# Patient Record
Sex: MALE | Race: WHITE | Employment: OTHER | URBAN - METROPOLITAN AREA
[De-identification: names, ages, dates, MRNs, and addresses within clinical notes are randomized per-mention and may not be internally consistent; named-entity substitution may affect disease eponyms.]

---

## 2017-01-18 ENCOUNTER — HOSPITAL ENCOUNTER (OUTPATIENT)
Dept: ULTRASOUND IMAGING | Age: 67
Discharge: HOME | End: 2017-01-18
Attending: SPECIALIST
Payer: MEDICARE

## 2017-01-18 DIAGNOSIS — E04.1 NONTOXIC UNINODULAR GOITER: ICD-10-CM

## 2017-01-18 PROCEDURE — 10022 US GUIDE FINE NDL ASP W IMAGE: CPT

## 2017-01-18 PROCEDURE — 88173 CYTOPATH EVAL FNA REPORT: CPT | Performed by: SPECIALIST

## 2017-01-18 PROCEDURE — 88172 CYTP DX EVAL FNA 1ST EA SITE: CPT | Performed by: SPECIALIST

## 2017-02-28 ENCOUNTER — HOSPITAL ENCOUNTER (OUTPATIENT)
Dept: GENERAL RADIOLOGY | Age: 67
Discharge: HOME | End: 2017-02-28
Payer: MEDICARE

## 2017-02-28 DIAGNOSIS — S80.01XA CONTUSION OF KNEE, RIGHT: ICD-10-CM

## 2017-02-28 PROCEDURE — 73562 X-RAY EXAM OF KNEE 3: CPT

## 2019-01-02 RX ORDER — ASPIRIN 325 MG
325 TABLET ORAL DAILY
COMMUNITY
End: 2022-10-11 | Stop reason: ALTCHOICE

## 2019-01-02 RX ORDER — AMLODIPINE BESYLATE 10 MG/1
10 TABLET ORAL DAILY
COMMUNITY
End: 2022-08-18

## 2019-01-02 RX ORDER — ENALAPRIL MALEATE 20 MG/1
20 TABLET ORAL DAILY
COMMUNITY
End: 2022-10-11 | Stop reason: ALTCHOICE

## 2019-01-02 RX ORDER — SIMVASTATIN 20 MG/1
20 TABLET, FILM COATED ORAL
COMMUNITY
End: 2022-10-11 | Stop reason: SDUPTHER

## 2019-01-02 RX ORDER — CHOLECALCIFEROL (VITAMIN D3) 125 MCG
4000 CAPSULE ORAL
COMMUNITY

## 2019-01-03 ENCOUNTER — ANESTHESIA EVENT (OUTPATIENT)
Dept: ENDOSCOPY | Age: 69
End: 2019-01-03
Payer: MEDICARE

## 2019-01-03 ENCOUNTER — HOSPITAL ENCOUNTER (OUTPATIENT)
Age: 69
Setting detail: OUTPATIENT SURGERY
Discharge: HOME | End: 2019-01-03
Attending: INTERNAL MEDICINE | Admitting: INTERNAL MEDICINE
Payer: MEDICARE

## 2019-01-03 ENCOUNTER — ANESTHESIA (OUTPATIENT)
Dept: ENDOSCOPY | Age: 69
End: 2019-01-03
Payer: MEDICARE

## 2019-01-03 VITALS
WEIGHT: 239.06 LBS | HEIGHT: 71 IN | OXYGEN SATURATION: 96 % | SYSTOLIC BLOOD PRESSURE: 155 MMHG | DIASTOLIC BLOOD PRESSURE: 77 MMHG | HEART RATE: 87 BPM | RESPIRATION RATE: 12 BRPM | TEMPERATURE: 97.5 F | BODY MASS INDEX: 33.47 KG/M2

## 2019-01-03 PROCEDURE — 76060000031 HC ANESTHESIA FIRST 0.5 HR: Performed by: INTERNAL MEDICINE

## 2019-01-03 PROCEDURE — 74011250636 HC RX REV CODE- 250/636: Performed by: INTERNAL MEDICINE

## 2019-01-03 PROCEDURE — 74011250636 HC RX REV CODE- 250/636

## 2019-01-03 PROCEDURE — 76040000019: Performed by: INTERNAL MEDICINE

## 2019-01-03 RX ORDER — NALOXONE HYDROCHLORIDE 0.4 MG/ML
0.4 INJECTION, SOLUTION INTRAMUSCULAR; INTRAVENOUS; SUBCUTANEOUS
Status: DISCONTINUED | OUTPATIENT
Start: 2019-01-03 | End: 2019-01-03 | Stop reason: HOSPADM

## 2019-01-03 RX ORDER — FENTANYL CITRATE 50 UG/ML
25 INJECTION, SOLUTION INTRAMUSCULAR; INTRAVENOUS
Status: DISCONTINUED | OUTPATIENT
Start: 2019-01-03 | End: 2019-01-03 | Stop reason: HOSPADM

## 2019-01-03 RX ORDER — EPINEPHRINE 0.1 MG/ML
1 INJECTION INTRACARDIAC; INTRAVENOUS
Status: DISCONTINUED | OUTPATIENT
Start: 2019-01-03 | End: 2019-01-03 | Stop reason: HOSPADM

## 2019-01-03 RX ORDER — SODIUM CHLORIDE 0.9 % (FLUSH) 0.9 %
5-10 SYRINGE (ML) INJECTION EVERY 8 HOURS
Status: DISCONTINUED | OUTPATIENT
Start: 2019-01-03 | End: 2019-01-03 | Stop reason: HOSPADM

## 2019-01-03 RX ORDER — LIDOCAINE HYDROCHLORIDE 20 MG/ML
INJECTION, SOLUTION EPIDURAL; INFILTRATION; INTRACAUDAL; PERINEURAL AS NEEDED
Status: DISCONTINUED | OUTPATIENT
Start: 2019-01-03 | End: 2019-01-03 | Stop reason: HOSPADM

## 2019-01-03 RX ORDER — DEXTROMETHORPHAN/PSEUDOEPHED 2.5-7.5/.8
1.2 DROPS ORAL
Status: DISCONTINUED | OUTPATIENT
Start: 2019-01-03 | End: 2019-01-03 | Stop reason: HOSPADM

## 2019-01-03 RX ORDER — MIDAZOLAM HYDROCHLORIDE 1 MG/ML
.25-5 INJECTION, SOLUTION INTRAMUSCULAR; INTRAVENOUS
Status: DISCONTINUED | OUTPATIENT
Start: 2019-01-03 | End: 2019-01-03 | Stop reason: HOSPADM

## 2019-01-03 RX ORDER — SODIUM CHLORIDE 0.9 % (FLUSH) 0.9 %
5-10 SYRINGE (ML) INJECTION AS NEEDED
Status: DISCONTINUED | OUTPATIENT
Start: 2019-01-03 | End: 2019-01-03 | Stop reason: HOSPADM

## 2019-01-03 RX ORDER — FLUMAZENIL 0.1 MG/ML
0.2 INJECTION INTRAVENOUS
Status: DISCONTINUED | OUTPATIENT
Start: 2019-01-03 | End: 2019-01-03 | Stop reason: HOSPADM

## 2019-01-03 RX ORDER — SODIUM CHLORIDE 9 MG/ML
75 INJECTION, SOLUTION INTRAVENOUS CONTINUOUS
Status: DISCONTINUED | OUTPATIENT
Start: 2019-01-03 | End: 2019-01-03 | Stop reason: HOSPADM

## 2019-01-03 RX ORDER — FENTANYL CITRATE 50 UG/ML
50 INJECTION, SOLUTION INTRAMUSCULAR; INTRAVENOUS
Status: DISCONTINUED | OUTPATIENT
Start: 2019-01-03 | End: 2019-01-03 | Stop reason: HOSPADM

## 2019-01-03 RX ORDER — PROPOFOL 10 MG/ML
INJECTION, EMULSION INTRAVENOUS AS NEEDED
Status: DISCONTINUED | OUTPATIENT
Start: 2019-01-03 | End: 2019-01-03 | Stop reason: HOSPADM

## 2019-01-03 RX ORDER — ATROPINE SULFATE 0.1 MG/ML
0.5 INJECTION INTRAVENOUS
Status: DISCONTINUED | OUTPATIENT
Start: 2019-01-03 | End: 2019-01-03 | Stop reason: HOSPADM

## 2019-01-03 RX ADMIN — PROPOFOL 50 MG: 10 INJECTION, EMULSION INTRAVENOUS at 08:14

## 2019-01-03 RX ADMIN — PROPOFOL 25 MG: 10 INJECTION, EMULSION INTRAVENOUS at 08:16

## 2019-01-03 RX ADMIN — PROPOFOL 20 MG: 10 INJECTION, EMULSION INTRAVENOUS at 08:12

## 2019-01-03 RX ADMIN — PROPOFOL 25 MG: 10 INJECTION, EMULSION INTRAVENOUS at 08:19

## 2019-01-03 RX ADMIN — PROPOFOL 25 MG: 10 INJECTION, EMULSION INTRAVENOUS at 08:20

## 2019-01-03 RX ADMIN — PROPOFOL 80 MG: 10 INJECTION, EMULSION INTRAVENOUS at 08:11

## 2019-01-03 RX ADMIN — PROPOFOL 25 MG: 10 INJECTION, EMULSION INTRAVENOUS at 08:15

## 2019-01-03 RX ADMIN — PROPOFOL 25 MG: 10 INJECTION, EMULSION INTRAVENOUS at 08:22

## 2019-01-03 RX ADMIN — PROPOFOL 25 MG: 10 INJECTION, EMULSION INTRAVENOUS at 08:13

## 2019-01-03 RX ADMIN — SODIUM CHLORIDE 75 ML/HR: 900 INJECTION, SOLUTION INTRAVENOUS at 07:54

## 2019-01-03 RX ADMIN — LIDOCAINE HYDROCHLORIDE 20 MG: 20 INJECTION, SOLUTION EPIDURAL; INFILTRATION; INTRACAUDAL; PERINEURAL at 08:11

## 2019-01-03 NOTE — H&P
Gastroenterology Outpatient History and Physical    Patient: Juventino Bernardo    Physician: Mariana Jurado MD    Chief Complaint: CRC screening  History of Present Illness: 70yo M with need for CRC screening, last colonoscopy in 2002. Some perianal discomfort related to anal skin tags. History:  Past Medical History:   Diagnosis Date    Hypertension       Past Surgical History:   Procedure Laterality Date    HX APPENDECTOMY        Social History     Socioeconomic History    Marital status:      Spouse name: Not on file    Number of children: Not on file    Years of education: Not on file    Highest education level: Not on file   Tobacco Use    Smoking status: Never Smoker    Smokeless tobacco: Never Used   Substance and Sexual Activity    Alcohol use: Yes     Comment: 2 beers per month    Drug use: No      Family History   Problem Relation Age of Onset    Hypertension Mother     Cancer Mother     Cancer Sister     There is no problem list on file for this patient. Allergies: No Known Allergies  Medications:   Prior to Admission medications    Medication Sig Start Date End Date Taking? Authorizing Provider   amLODIPine (NORVASC) 10 mg tablet Take 10 mg by mouth daily. Yes Provider, Historical   enalapril (VASOTEC) 20 mg tablet Take 20 mg by mouth daily. Yes Provider, Historical   aspirin (ASPIRIN) 325 mg tablet Take 325 mg by mouth daily. Yes Provider, Historical   simvastatin (ZOCOR) 20 mg tablet Take 20 mg by mouth nightly. Yes Provider, Historical   cholecalciferol, vitamin D3, (VITAMIN D3) 2,000 unit tab Take 4,000 Units by mouth. Yes Provider, Historical   TURMERIC PO Take 1,000 mg by mouth. Yes Provider, Historical   multivit-min/folic/vit K/lycop (MEN'S DAILY FORMULA PO) Take  by mouth daily. Yes Provider, Historical     Physical Exam:   Vital Signs: Blood pressure (!) 163/93, pulse 95, temperature 98.1 °F (36.7 °C), resp.  rate 18, height 5' 11\" (1.803 m), weight 108.4 kg (239 lb 1 oz), SpO2 96 %.   General: well developed, well nourished   HEENT: unremarkable   Heart: regular rhythm no mumur    Lungs: clear   Abdominal:  benign   Neurological: unremarkable   Extremities: no edema     Findings/Diagnosis: CRC screening  Plan of Care/Planned Procedure: Colonoscopy with conscious/deep sedation    Signed:  Branden Levin MD 1/3/2019

## 2019-01-03 NOTE — ROUTINE PROCESS
Shannon Burks  1950  [id number]    Situation:  Verbal report received from: Lauren Nieto RN  Procedure: Procedure(s):  COLONOSCOPY    Background:    Preoperative diagnosis: ANAL SKIN TAG, SCREENING  Postoperative diagnosis: internal hemorrhoids    :  Dr. Marilyn Bazan  Assistant(s): Endoscopy Technician-1: Arabella Wright  Endoscopy RN-1: Angelo Linn    Specimens: * No specimens in log *  H. Pylori  no    Assessment:  Intra-procedure medications     Anesthesia gave intra-procedure sedation and medications, see anesthesia flow sheet     Intravenous fluids: NS@ KVO     Vital signs stable     Abdominal assessment: round and soft     Recommendation:  Discharge patient per MD order  Family or Friend     Permission to share finding with family or friend

## 2019-01-03 NOTE — ANESTHESIA PREPROCEDURE EVALUATION
Anesthetic History   No history of anesthetic complications            Review of Systems / Medical History  Patient summary reviewed, nursing notes reviewed and pertinent labs reviewed    Pulmonary  Within defined limits                 Neuro/Psych   Within defined limits           Cardiovascular  Within defined limits  Hypertension              Exercise tolerance: >4 METS     GI/Hepatic/Renal  Within defined limits              Endo/Other  Within defined limits           Other Findings              Physical Exam    Airway  Mallampati: II  TM Distance: 4 - 6 cm  Neck ROM: normal range of motion   Mouth opening: Normal     Cardiovascular  Regular rate and rhythm,  S1 and S2 normal,  no murmur, click, rub, or gallop             Dental  No notable dental hx       Pulmonary  Breath sounds clear to auscultation               Abdominal  GI exam deferred       Other Findings            Anesthetic Plan    ASA: 2  Anesthesia type: general and total IV anesthesia          Induction: Intravenous  Anesthetic plan and risks discussed with: Patient      Propofol MAC

## 2019-01-03 NOTE — ANESTHESIA POSTPROCEDURE EVALUATION
Procedure(s):  COLONOSCOPY. Anesthesia Post Evaluation        Patient location during evaluation: PACU  Note status: Adequate. Level of consciousness: responsive to verbal stimuli and sleepy but conscious  Pain management: satisfactory to patient  Airway patency: patent  Anesthetic complications: no  Cardiovascular status: acceptable  Respiratory status: acceptable  Hydration status: acceptable  Comments: +Post-Anesthesia Evaluation and Assessment    Patient: Radha Jessica MRN: [id number]  SSN: [id number]   YOB: 1950  Age: 70 y.o. Sex: male      Cardiovascular Function/Vital Signs    /89   Pulse 91   Temp 36.4 °C (97.5 °F)   Resp 14   Ht 5' 11\" (1.803 m)   Wt 108.4 kg (239 lb 1 oz)   SpO2 96%   BMI 33.34 kg/m²     Patient is status post Procedure(s):  COLONOSCOPY. Nausea/Vomiting: Controlled. Postoperative hydration reviewed and adequate. Pain:  Pain Scale 1: Visual (01/03/19 0844)  Pain Intensity 1: 0 (01/03/19 0836)   Managed. Neurological Status: At baseline. Mental Status and Level of Consciousness: Arousable. Pulmonary Status:   O2 Device: Room air (01/03/19 0844)   Adequate oxygenation and airway patent. Complications related to anesthesia: None    Post-anesthesia assessment completed. No concerns.     Signed By: Yu Singh MD    1/3/2019  Post anesthesia nausea and vomiting:  controlled      Visit Vitals  /89   Pulse 91   Temp 36.4 °C (97.5 °F)   Resp 14   Ht 5' 11\" (1.803 m)   Wt 108.4 kg (239 lb 1 oz)   SpO2 96%   BMI 33.34 kg/m²

## 2019-01-03 NOTE — DISCHARGE INSTRUCTIONS
Vannessa Melgar  [id number]  1950    COLON DISCHARGE INSTRUCTIONS  Discomfort:  Redness at IV site- apply warm compress to area; if redness or soreness persist- contact your physician  There may be a slight amount of blood passed from the rectum  Gaseous discomfort- walking, belching will help relieve any discomfort  You may not operate a vehicle for 12 hours  You may not engage in an occupation involving machinery or appliances for rest of today  You may not drink alcoholic beverages for at least 12 hours  Avoid making any critical decisions for at least 24 hour  DIET:   High fiber diet. - however -  remember your colon is empty and a heavy meal will produce gas. Avoid these foods:  vegetables, fried / greasy foods, carbonated drinks for today  MEDICATION:         ACTIVITY:  You may not resume your normal daily activities until tomorrow AM; it is recommended that you spend the remainder of the day resting -  avoid any strenuous activity. CALL M.D.   ANY SIGN OF:   Increasing pain, nausea, vomiting  Abdominal distension (swelling)  New increased bleeding (oral or rectal)  Fever (chills)  IMPRESSION:  -Grade 1 Internal and external hemorrhoids with skin tags  -No colon masses or polyps noted    Follow-up Instructions:   Call Dr. Lucía Botello if questions arise regarding your procedure  Telephone # [phone]  Repeat colonoscopy in 10 years    Mell Del Castillo MD

## 2019-01-03 NOTE — PROCEDURES
NAME:  Rain Carroll   :   1950   MRN:   [id number]     Date/Time:  1/3/2019 8:25 AM    Colonoscopy Operative Report    Procedure Type:   Colonoscopy --screening     Indications:     Screening colonoscopy  Pre-operative Diagnosis: see indication above  Post-operative Diagnosis:  See findings below  :  Magy Johnson MD  Referring Provider: Maximilian Recio MD    Exam:  Airway: clear, no airway problems anticipated  Heart: RRR, without gallops or rubs  Lungs: clear bilaterally without wheezes, crackles, or rhonchi  Abdomen: soft, nontender, nondistended, bowel sounds present  Mental Status: awake, alert and oriented to person, place and time    Sedation:  MAC anesthesia Propofol 300mg IV  Procedure Details:  After informed consent was obtained with all risks and benefits of procedure explained and preoperative exam completed, the patient was taken to the endoscopy suite and placed in the left lateral decubitus position. Upon sequential sedation as per above, a digital rectal exam was performed demonstrating internal and external hemorrhoids. The Olympus videocolonoscope  was inserted in the rectum and carefully advanced to the cecum, which was identified by the ileocecal valve and appendiceal orifice. The quality of preparation was adequate. The colonoscope was slowly withdrawn with careful evaluation between folds. Retroflexion in the rectum was completed demonstrating internal and external hemorrhoids. Findings:     -Grade 1 Internal and external hemorrhoids with skin tags  -No colon masses or polyps noted    Specimen Removed:  None. Complications: None. EBL:  None. Impression:    -Grade 1 Internal and external hemorrhoids with skin tags  -No colon masses or polyps noted    Recommendations: --For colon cancer screening in this average-risk patient, colonoscopy may be repeated in 10 years. High fiber diet. Resume normal medication(s).          Discharge Disposition:  Home in the company of a  when able to ambulate.     Elisa Rojas MD

## 2019-05-15 ENCOUNTER — HOSPITAL ENCOUNTER (OUTPATIENT)
Dept: ULTRASOUND IMAGING | Age: 69
Discharge: HOME | End: 2019-05-15
Attending: INTERNAL MEDICINE
Payer: MEDICARE

## 2019-05-15 DIAGNOSIS — R80.9 PROTEINURIA: ICD-10-CM

## 2019-05-15 DIAGNOSIS — N18.2 CHRONIC KIDNEY DISEASE, STAGE II (MILD): ICD-10-CM

## 2019-05-15 PROCEDURE — 76770 US EXAM ABDO BACK WALL COMP: CPT

## 2022-08-18 ENCOUNTER — HOSPITAL ENCOUNTER (EMERGENCY)
Age: 72
Discharge: HOME | End: 2022-08-18
Attending: EMERGENCY MEDICINE
Payer: MEDICARE

## 2022-08-18 VITALS
TEMPERATURE: 98.1 F | SYSTOLIC BLOOD PRESSURE: 183 MMHG | DIASTOLIC BLOOD PRESSURE: 98 MMHG | WEIGHT: 215 LBS | HEIGHT: 71 IN | OXYGEN SATURATION: 97 % | BODY MASS INDEX: 30.1 KG/M2 | HEART RATE: 79 BPM | RESPIRATION RATE: 18 BRPM

## 2022-08-18 DIAGNOSIS — I16.0 ASYMPTOMATIC HYPERTENSIVE URGENCY: Primary | ICD-10-CM

## 2022-08-18 LAB
ANION GAP SERPL CALC-SCNC: 5 MMOL/L (ref 5–15)
ATRIAL RATE: 85 BPM
BUN SERPL-MCNC: 23 MG/DL (ref 6–20)
BUN/CREAT SERPL: 20 (ref 12–20)
CALCIUM SERPL-MCNC: 9.2 MG/DL (ref 8.5–10.1)
CALCULATED P AXIS, ECG09: 40 DEGREES
CALCULATED R AXIS, ECG10: -17 DEGREES
CALCULATED T AXIS, ECG11: 42 DEGREES
CHLORIDE SERPL-SCNC: 104 MMOL/L (ref 97–108)
CO2 SERPL-SCNC: 30 MMOL/L (ref 21–32)
COMMENT, HOLDF: NORMAL
CREAT SERPL-MCNC: 1.13 MG/DL (ref 0.7–1.3)
DIAGNOSIS, 93000: NORMAL
GLUCOSE SERPL-MCNC: 103 MG/DL (ref 65–100)
P-R INTERVAL, ECG05: 176 MS
POTASSIUM SERPL-SCNC: 3.6 MMOL/L (ref 3.5–5.1)
Q-T INTERVAL, ECG07: 380 MS
QRS DURATION, ECG06: 86 MS
QTC CALCULATION (BEZET), ECG08: 452 MS
SAMPLES BEING HELD,HOLD: NORMAL
SODIUM SERPL-SCNC: 139 MMOL/L (ref 136–145)
VENTRICULAR RATE, ECG03: 85 BPM

## 2022-08-18 PROCEDURE — 80048 BASIC METABOLIC PNL TOTAL CA: CPT

## 2022-08-18 PROCEDURE — 93005 ELECTROCARDIOGRAM TRACING: CPT

## 2022-08-18 PROCEDURE — 74011250637 HC RX REV CODE- 250/637

## 2022-08-18 PROCEDURE — 99284 EMERGENCY DEPT VISIT MOD MDM: CPT

## 2022-08-18 PROCEDURE — 36415 COLL VENOUS BLD VENIPUNCTURE: CPT

## 2022-08-18 RX ORDER — CLONIDINE HYDROCHLORIDE 0.1 MG/1
0.1 TABLET ORAL
Status: COMPLETED | OUTPATIENT
Start: 2022-08-18 | End: 2022-08-18

## 2022-08-18 RX ORDER — AMLODIPINE BESYLATE 10 MG/1
10 TABLET ORAL DAILY
Qty: 30 TABLET | Refills: 0 | Status: SHIPPED | OUTPATIENT
Start: 2022-08-18 | End: 2022-09-17

## 2022-08-18 RX ADMIN — CLONIDINE HYDROCHLORIDE 0.1 MG: 0.1 TABLET ORAL at 12:21

## 2022-08-18 NOTE — ED PROVIDER NOTES
EMERGENCY DEPARTMENT HISTORY AND PHYSICAL EXAM          Date: 8/18/2022  Patient Name: Roney Dawkins  Attending of Record: Baljinder Gómez MD     History of Presenting Illness     Chief Complaint   Patient presents with    Hypertension     Pt arrives via EMS from home d/t hypertension. Pt felt BP \"spike\" this am and called EMS. Issue seemed to resolve after he took losartan 50 mg and 81 mg asa. Pt felt BP spike again and called EMS once more to be transported to hospital. Pt note he had some dizziness this am which is now resolved. History Provided By: Patient and Patient's Wife    HPI: Patient is a 70-year-old male with past medical history of hypertension who presents emergency department with hypertension. He reports redness this morning and feeling a slight pressure in his head that is typically what he feels when his blood pressure is elevated. He checked his pressure and it was in the low 569Q systolic. Shortly later the patient took his home antihypertensives and his wife encouraged him to call EMS. Approximate hour after his initial blood pressure was elevated EMS arrived at that time his pressure was reportedly 179 systolic. He declined transport to the ED and stayed home. Approximate hour after that his blood pressure was again noted to be in the 345I systolic at that time patient spoke with his PCP who encouraged him to come straight to the emergency department. Patient reports that he has had no chest pain, no shortness of breath, no abdominal pain, no nausea, no vomiting, no fevers, no chills. He denies headache but states he has had a slight head pressure however this is nearly resolved at this time. Patient is currently taking losartan 50 mg daily. No recent medication changes. Formerly on amlodipine 10 mg. Symptoms of been intermittent, improved with medications. No other complaints at this time. There are no other complaints, changes, or physical findings at this time. PCP: Jazlyn Nathan MD    Current Outpatient Medications   Medication Sig Dispense Refill    amLODIPine (NORVASC) 10 mg tablet Take 1 Tablet by mouth daily for 30 days. 30 Tablet 0    enalapril (VASOTEC) 20 mg tablet Take 20 mg by mouth daily. aspirin (ASPIRIN) 325 mg tablet Take 325 mg by mouth daily. simvastatin (ZOCOR) 20 mg tablet Take 20 mg by mouth nightly. cholecalciferol, vitamin D3, (VITAMIN D3) 2,000 unit tab Take 4,000 Units by mouth. TURMERIC PO Take 1,000 mg by mouth.      multivit-min/folic/vit K/lycop (MEN'S DAILY FORMULA PO) Take  by mouth daily. Past History     Past Medical History:  Past Medical History:   Diagnosis Date    Hypertension        Past Surgical History:  Past Surgical History:   Procedure Laterality Date    COLONOSCOPY N/A 1/3/2019    COLONOSCOPY performed by Deepak Garcia MD at \[facility]\"" ENDOSCOPY    COLONOSCOPY,DIAGNOSTIC  1/3/2019         HX APPENDECTOMY         Family History:  Family History   Problem Relation Age of Onset    Hypertension Mother     Cancer Mother     Cancer Sister        Social History:  Social History     Tobacco Use    Smoking status: Never    Smokeless tobacco: Never   Substance Use Topics    Alcohol use: Yes     Comment: 2 beers per month    Drug use: No       Allergies:  No Known Allergies      Review of Systems   Review of Systems   Constitutional:  Negative for chills and fever. HENT:  Negative for ear pain and sore throat. Eyes:  Negative for redness and visual disturbance. Respiratory:  Negative for cough and shortness of breath. Cardiovascular:  Negative for chest pain. Gastrointestinal:  Negative for abdominal pain, constipation, diarrhea, nausea and vomiting. Genitourinary:  Negative for dysuria and hematuria. Musculoskeletal:  Negative for arthralgias and myalgias. Skin:  Negative for rash. Neurological:  Positive for headaches (\"pressure, not a headache\"). Negative for syncope.    Psychiatric/Behavioral: Negative for agitation and confusion. Physical Exam   Physical Exam  Constitutional:       General: He is awake. He is not in acute distress. HENT:      Head: Normocephalic and atraumatic. Right Ear: External ear normal.      Left Ear: External ear normal.      Nose: Nose normal.      Mouth/Throat:      Mouth: Mucous membranes are moist.      Pharynx: Oropharynx is clear. Eyes:      General: No scleral icterus. Conjunctiva/sclera: Conjunctivae normal.   Neck:      Thyroid: No thyromegaly. Trachea: No tracheal deviation. Cardiovascular:      Rate and Rhythm: Normal rate and regular rhythm. Pulmonary:      Effort: Pulmonary effort is normal. No respiratory distress. Breath sounds: Normal breath sounds. Abdominal:      Palpations: Abdomen is soft. Tenderness: There is no abdominal tenderness. Musculoskeletal:         General: No tenderness or deformity. Cervical back: No tenderness. Skin:     General: Skin is warm and dry. Neurological:      General: No focal deficit present. Mental Status: He is alert.    Psychiatric:         Mood and Affect: Mood normal.         Behavior: Behavior normal.        Diagnostic Study Results     Labs -     Recent Results (from the past 12 hour(s))   EKG, 12 LEAD, INITIAL    Collection Time: 08/18/22  9:53 AM   Result Value Ref Range    Ventricular Rate 85 BPM    Atrial Rate 85 BPM    P-R Interval 176 ms    QRS Duration 86 ms    Q-T Interval 380 ms    QTC Calculation (Bezet) 452 ms    Calculated P Axis 40 degrees    Calculated R Axis -17 degrees    Calculated T Axis 42 degrees    Diagnosis       Normal sinus rhythm  No previous ECGs available  Confirmed by Elizabeth Amaro ([id number]) on 8/18/2022 11:47:19 AM     SAMPLES BEING HELD    Collection Time: 08/18/22 10:14 AM   Result Value Ref Range    SAMPLES BEING HELD SST, LAV, DRK GRN     COMMENT        Add-on orders for these samples will be processed based on acceptable specimen integrity and analyte stability, which may vary by analyte. METABOLIC PANEL, BASIC    Collection Time: 08/18/22 10:14 AM   Result Value Ref Range    Sodium 139 136 - 145 mmol/L    Potassium 3.6 3.5 - 5.1 mmol/L    Chloride 104 97 - 108 mmol/L    CO2 30 21 - 32 mmol/L    Anion gap 5 5 - 15 mmol/L    Glucose 103 (H) 65 - 100 mg/dL    BUN 23 (H) 6 - 20 MG/DL    Creatinine 1.13 0.70 - 1.30 MG/DL    BUN/Creatinine ratio 20 12 - 20      GFR est AA >60 >60 ml/min/1.73m2    GFR est non-AA >60 >60 ml/min/1.73m2    Calcium 9.2 8.5 - 10.1 MG/DL       Radiologic Studies -   No orders to display     CT Results  (Last 48 hours)      None          CXR Results  (Last 48 hours)      None              Medical Decision Making   I am the first provider for this patient. I reviewed the vital signs, available nursing notes, past medical history, past surgical history, family history and social history. Vital Signs-Reviewed the patient's vital signs. Patient Vitals for the past 12 hrs:   Temp Pulse Resp BP SpO2   08/18/22 1330 -- 79 18 (!) 183/98 97 %   08/18/22 1315 -- 66 15 (!) 172/95 96 %   08/18/22 1300 -- 65 17 (!) 193/104 97 %   08/18/22 1248 -- 73 19 (!) 172/93 97 %   08/18/22 1230 -- 66 11 (!) 175/101 98 %   08/18/22 1221 -- 80 -- (!) 191/116 --   08/18/22 1200 -- 76 27 (!) 191/116 98 %   08/18/22 1130 -- 72 17 (!) 186/104 97 %   08/18/22 1100 -- 71 18 (!) 195/99 97 %   08/18/22 1050 -- 71 20 (!) 193/96 97 %   08/18/22 1001 98.1 °F (36.7 °C) 83 18 (!) 214/111 96 %   08/18/22 0953 -- 85 16 -- 95 %       Records Reviewed: Nursing Notes and Old Medical Records    Provider Notes (Medical Decision Making):   Patient is a 79-year-old male with past medical history of hypertension who presents emergency department with hypertension. Further history as above. Hemodynamically stable, afebrile. Exam reveals a comfortable appearing patient laying on the stretcher. No acute distress.   Heart and lungs clear to auscultation, no focal neurologic deficits. Presentation concerning for asymptomatic hypertensive urgency, differential includes hypertensive emergency, hypertension, CVA, dehydration, anxiety. Given normal exam and gradually improving blood pressure will give 1 dose 0.1 mg clonidine and obtain BMP as requested by family. We will plan to discharge home with addition of home 10 mg amlodipine provided no lab abnormality. Patient and family voice understanding agreement with this plan. ED Course and Progress Notes:   Initial assessment performed. The patients presenting problems have been discussed, and they are in agreement with the care plan formulated and outlined with them. I have encouraged them to ask questions as they arise throughout their visit. ED Course as of 08/18/22 1404   Thu Aug 18, 2022   1251 BASIC METABOLIC PANEL(!):    Sodium 139   Potassium 3.6   Chloride 104   CO2 30   Anion gap 5   Glucose 103(!)   BUN 23(!)   Creatinine 1.13   BUN/Creatinine ratio 20   GFR est AA >60   GFR est non-AA >60   Calcium 9.2 [EW]      ED Course User Index  [EW] Kimberly Jones MD         Diagnosis     Clinical Impression:   1. Asymptomatic hypertensive urgency        Disposition:  Discharge    DISCHARGE PLAN:    1. Discharge Medication List as of 8/18/2022  1:33 PM        CONTINUE these medications which have CHANGED    Details   amLODIPine (NORVASC) 10 mg tablet Take 1 Tablet by mouth daily for 30 days. , Normal, Disp-30 Tablet, R-0           CONTINUE these medications which have NOT CHANGED    Details   enalapril (VASOTEC) 20 mg tablet Take 20 mg by mouth daily. , Historical Med      aspirin (ASPIRIN) 325 mg tablet Take 325 mg by mouth daily. , Historical Med      simvastatin (ZOCOR) 20 mg tablet Take 20 mg by mouth nightly., Historical Med      cholecalciferol, vitamin D3, (VITAMIN D3) 2,000 unit tab Take 4,000 Units by mouth., Historical Med      TURMERIC PO Take 1,000 mg by mouth., Historical Med      multivit-min/folic/vit K/lycop (MEN'S DAILY FORMULA PO) Take  by mouth daily. , Historical Med           2. Follow-up Information       Follow up With Specialties Details Why Contact Info    Mandeep Salazar MD Family Medicine Schedule an appointment as soon as possible for a visit   [address]  [address] [place] [address] Dr REYNA [phone]      [facility]ospit[facility] EMERGENCY DEPT Emergency Medicine Go to  If symptoms worsen or you have any concerns [address] [place] [address]  [address] [facility]  [phone]          3.  Return to ED if worse     Resident Signature:   Kell Aranda DO  PGY 2

## 2022-10-11 PROBLEM — E78.5 HYPERLIPIDEMIA: Status: ACTIVE | Noted: 2022-10-11

## 2022-10-11 PROBLEM — Z79.899 ENCOUNTER FOR LONG-TERM (CURRENT) USE OF OTHER MEDICATIONS: Status: ACTIVE | Noted: 2022-10-11

## 2022-10-11 PROBLEM — R80.9 PROTEINURIA: Status: ACTIVE | Noted: 2022-10-11

## 2022-10-11 PROBLEM — F41.9 ANXIETY: Status: ACTIVE | Noted: 2022-10-11

## 2022-10-11 PROBLEM — I10 HYPERTENSION: Status: ACTIVE | Noted: 2022-10-11

## 2022-10-12 PROBLEM — D69.2 SENILE PURPURA (HCC): Status: ACTIVE | Noted: 2022-10-12

## (undated) DEVICE — Device

## (undated) DEVICE — SET ADMIN 16ML TBNG L100IN 2 Y INJ SITE IV PIGGY BK DISP

## (undated) DEVICE — KENDALL RADIOLUCENT FOAM MONITORING ELECTRODE RECTANGULAR SHAPE: Brand: KENDALL

## (undated) DEVICE — 1200 GUARD II KIT W/5MM TUBE W/O VAC TUBE: Brand: GUARDIAN

## (undated) DEVICE — SOLIDIFIER MEDC 1200ML -- CONVERT TO 356117

## (undated) DEVICE — BASIN EMSIS 16OZ GRAPHITE PLAS KID SHP MOLD GRAD FOR ORAL

## (undated) DEVICE — NEEDLE HYPO 18GA L1.5IN PNK S STL HUB POLYPR SHLD REG BVL

## (undated) DEVICE — TOWEL 4 PLY TISS 19X30 SUE WHT

## (undated) DEVICE — CATH IV AUTOGRD BC BLU 22GA 25 -- INSYTE

## (undated) DEVICE — Z DISCONTINUED PER MEDLINE LINE GAS SAMPLING O2/CO2 LNG AD 13 FT NSL W/ TBNG FILTERLINE

## (undated) DEVICE — SYR 3ML LL TIP 1/10ML GRAD --

## (undated) DEVICE — SYR 10ML LUER LOK 1/5ML GRAD --

## (undated) DEVICE — NEONATAL-ADULT SPO2 SENSOR: Brand: NELLCOR